# Patient Record
Sex: MALE | Race: WHITE | Employment: FULL TIME | URBAN - METROPOLITAN AREA
[De-identification: names, ages, dates, MRNs, and addresses within clinical notes are randomized per-mention and may not be internally consistent; named-entity substitution may affect disease eponyms.]

---

## 2023-02-07 ENCOUNTER — HOSPITAL ENCOUNTER (OUTPATIENT)
Dept: PHYSICAL THERAPY | Age: 48
Discharge: HOME | End: 2023-02-07
Payer: COMMERCIAL

## 2023-02-07 PROCEDURE — 97162 PT EVAL MOD COMPLEX 30 MIN: CPT

## 2023-02-07 NOTE — PROGRESS NOTES
[facility] at [address] [place] [address]  Phone: [phone]   Fax: [phone]     Plan of Care/ Statement of Necessity for Physical Therapy Services    Patient name: Le Neal Start of Care: 2023   Referral source: Iasbel Nieves MD : 1975    Medical Diagnosis: Pain in left shoulder [M25.512]   Onset Date: 23    Treatment Diagnosis:  Pain in left shoulder [M25.512]   Prior Hospitalization: see medical history Provider#: [id number]   Medications: Verified on Patient summary List    Comorbidities: Previous left shoulder pain prior to injury leading to surgery, Hx right shoulder pain   Prior Level of Function: functionally independent, active lifestyle including tennis, basketball, and cross fit      The Plan of Care and following information is based on the information from the initial evaluation. Assessment/ key information: Patient is a  39yo male who presents to [facility] with c/o left shoulder pain. Patient is s/p biceps tenodesis with rotator cuff repair (partial) and labral repair on 23. Patient reports his tears were partial and he is weaning from the sling, per physician's orders. Patient's impairments include pain, limited ROM, weakness. Patient reports decreased PLOF including difficulty reaching overhead to ADLs, difficulty with most ADLs, and inability to work out. Patient demonstrates decreased ROM, decreased strength, impaired posture, impaired gait mechancis, pain and decreased functional mobility tolerance. Evaluation Complexity History MEDIUM  Complexity : 1-2 comorbidities / personal factors will impact the outcome/ POC ; Examination HIGH Complexity : 4+ Standardized tests and measures addressing body structure, function, activity limitation and / or participation in recreation  ;Presentation MEDIUM Complexity : Evolving with changing characteristics  ; Clinical Decision Making HIGH Complexity : FOTO score of 1- 25  : FOTO score = an established functional score where 100 = no disability  Overall Complexity Rating: MEDIUM    Problem List: pain affecting function, decrease ROM, decrease strength, edema affecting function, impaired gait/ balance, decrease ADL/ functional abilitiies, decrease activity tolerance, decrease flexibility/ joint mobility, and decrease transfer abilities   Treatment Plan may include any combination of the following: Therapeutic exercise, Neuromuscular reeducation, Manual therapy, Therapeutic activity, Self care/home management, and Vasopneumatic device  Vasopnuematic compression justification:  Per bilateral girth measures taken and listed above the edema is considered significant and having an impact on the patient's strength, self care, and ADLs  Patient / Family readiness to learn indicated by: asking questions and interest  Persons(s) to be included in education: patient (P)  Barriers to Learning/Limitations: None  Measures taken if barriers to learning: NA  Patient Goal (s): get back to 'new' 100%  Patient Self Reported Health Status: excellent  Rehabilitation Potential: good    Short Term Goals: To be accomplished in 4 weeks:  Patient will be independent and compliant with HEP to progress toward goals and restore functional mobility. Eval Status: issued at eval     Pt will have painfree shoulder PROM to [facility] to aid in functional mechanics for ambulation/ADLs. Eval Status:   ROM:                            PROM              Shoulder Left Right   Flexion 57 WNL   ABD 65 WNL   ER 8  (Measured at 35 degrees abduction) WNL   IR Within available limits  (Measured at 35 degrees abduction) WNL         Long Term Goals: To be accomplished in 8 weeks:  Patient will improve FOTO score by 59 points (to 64) to improve functional tolerance for restoration of ADLs.   Eval Status: FOTO 4  FOTO score = an established functional score where 100 = no disability     Pt will have painfree left shoulder AROM to aid in functional mechanics for ambulation/ADLs. Eval Status:                                             AROM                            Shoulder Left Right   Flexion 58 WNL   ABD 52 WNL   ER NT due to pain WNL   IR Within available limits  (Measured at 35 degrees abduction) WNL         Pt will have at least 4/5 left shoulder strength to return to goals of returning to modified gym workouts. Eval Status: Strength NT due to post-op status     Patient will improve pain in left shoulder to 0-2/10 at worst to improve ADL and reaching tolerance and restore prior level of function. Eval Status: 3-4/10 at worst      Frequency / Duration: Patient to be seen 2 times per week for 8 weeks. Patient/ Caregiver education and instruction: Diagnosis, prognosis, brace/ splint application and exercises   [x]  Plan of care has been reviewed with FIDE Vidal, PT 2/7/2023 12:52 PM    ________________________________________________________________________    I certify that the above Therapy Services are being furnished while the patient is under my care. I agree with the treatment plan and certify that this therapy is necessary.     Physician's Signature:_____________________Date:____________TIME:________                                      Benja Cruz MD      Please sign and return to [facility] at [address]  Phone: [phone]   Fax: [phone]

## 2023-02-07 NOTE — PROGRESS NOTES
PT DAILY TREATMENT NOTE/CERVICAL EVAL 10-18    Patient Name: Polina Davila  Date:2023  : 1975  [x]  Patient  Verified  Payor: Gino Matthews / Plan: [place] [employer] [place] / Product Type: PPO /    In time:1:36  Out time:2:30  Total Treatment Time (min): 54  Visit #: 1 of 16    Medicare/[facility] Only   Total Timed Codes (min):  5 1:1 Treatment Time:  49       Treatment Area: Pain in left shoulder [M25.512]    SUBJECTIVE    Any medication changes, allergies to medications, adverse drug reactions, diagnosis change, or new procedure performed?: [x] No    [] Yes (see summary sheet for update)  Subjective functional status/changes:     Current symptoms/Complaints: left shoulder pain, post-op 23: biceps tenodesis with rotator cuff repair (partial) and labral repair  Mechanism of Injury: getting out of bed in 2022, but had been hurting during workouts for months prior    PLOF: functionally independent, active lifestyle including tennis, basketball, and cross fit  Limitations to PLOF: most activities including ADLs due to post-op status and sling, not performing any exercise activities that include left shoulder, weaning from sling      Pain Level (0-10 scale):    Intermittent based on activities- currently weaning from brace per Dr. 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  Living Situation: Patient lives with wife and teenaged kids  Pt Goals: \"get back to 'new' 100%\"  Barriers: []pain []financial []time []transportation []other  Motivation: high  Substance use: [x]Alcohol (occasional) []Tobacco []other:   Cognition: A & O x 3        OBJECTIVE    49 min [x]Eval                  []Re-Eval       5 min Therapeutic Exercise:  [] See flow sheet :   Rationale: increase ROM, improve coordination, and increase proprioception to improve the patients ability to reach overhead and restore PLOF      With   [] TE   [] TA   [] neuro   [] other: Patient Education: [x] Review HEP    [] Progressed/Changed HEP based on:   [x] positioning   [x] body mechanics   [] transfers   [] heat/ice application    [] other:        General Evaluation  Posture: symmetrical scapulae    Neuro Screen [x] WNL    Palpation  [] Min  [x] Mod  [] Severe    Location: increased tone in biceps and coracobrachialis    Incisions: well-healed and no signs of infections      ROM:                             AROM    PROM   Shoulder Left Right Left Right   Flexion 58 WNL 57 WNL   ABD 52 WNL 65 WNL   ER NT due to pain WNL 8  (Measured at 35 degrees abduction) WNL   IR Within available limits  (Measured at 35 degrees abduction) WNL Within available limits  (Measured at 35 degrees abduction) WNL       Strength (MMT):  Scapular retraction: 50% palpated muscle contraction on left vs. Right with symmetrical motion  Shoulder Left (1-5)   Shoulder Flexion NT due to post-op status   Shoulder Extension NT due to post-op status   Shoulder ABD NT due to post-op status   Shoulder ADD NT due to post-op status   Shoulder IR NT due to post-op status   Shoulder ER NT due to post-op status                                                Pain Level (0-10 scale) post treatment: 3/10    ASSESSMENT/Changes in Function: Patient is a  41yo male who presents to [facility] with c/o left shoulder pain. Patient is s/p biceps tenodesis with rotator cuff repair (partial) and labral repair on 1/18/23. Patient reports his tears were partial and he is weaning from the sling, per physician's orders. Patient's impairments include pain, limited ROM, weakness. Patient reports decreased PLOF including difficulty reaching overhead to ADLs, difficulty with most ADLs, and inability to work out. Patient demonstrates decreased ROM, decreased strength, impaired posture, impaired gait mechancis, pain and decreased functional mobility tolerance. Patient will continue to benefit from skilled PT services to modify and progress therapeutic interventions, address functional mobility deficits, address ROM deficits, address strength deficits, analyze and address soft tissue restrictions, analyze and cue movement patterns, analyze and modify body mechanics/ergonomics, assess and modify postural abnormalities, address imbalance/dizziness, and instruct in home and community integration to attain remaining goals. [x]  See Plan of Care  []  See progress note/recertification  []  See Discharge Summary         Progress towards goals / Updated goals:    Short Term Goals: To be accomplished in 4 weeks:  Patient will be independent and compliant with HEP to progress toward goals and restore functional mobility. Eval Status: issued at [facility]    Pt will have painfree shoulder PROM to [facility] to aid in functional mechanics for ambulation/ADLs. Eval Status:   ROM:                         PROM   Shoulder Left Right   Flexion 57 WNL   ABD 65 WNL   ER 8  (Measured at 35 degrees abduction) WNL   IR Within available limits  (Measured at 35 degrees abduction) WNL       Long Term Goals: To be accomplished in 8 weeks:  Patient will improve FOTO score by 59 points (to 64) to improve functional tolerance for restoration of ADLs. Eval Status: FOTO 4  FOTO score = an established functional score where 100 = no disability    Pt will have painfree left shoulder AROM to aid in functional mechanics for ambulation/ADLs. Eval Status:          AROM      Shoulder Left Right   Flexion 58 WNL   ABD 52 WNL   ER NT due to pain WNL   IR Within available limits  (Measured at 35 degrees abduction) WNL       Pt will have at least 4/5 left shoulder strength to return to goals of returning to modified gym workouts.   Eval Status: Strength NT due to post-op status    Patient will improve pain in left shoulder to 0-2/10 at worst to improve ADL and reaching tolerance and restore prior level of function.   Eval Status: 3-4/10 at worst      PLAN  [x]  Upgrade activities as tolerated     [x]  Continue plan of care  [x]  Update interventions per flow sheet       []  Discharge due to:_  []  Other:_      Stephane Valdez, PT 2/7/2023  12:52 PM

## 2023-02-09 ENCOUNTER — APPOINTMENT (OUTPATIENT)
Dept: PHYSICAL THERAPY | Age: 48
End: 2023-02-09
Payer: COMMERCIAL

## 2023-02-09 NOTE — PROGRESS NOTES
PT DAILY TREATMENT NOTE     Patient Name: Corrinne Panda  Date:2/10/2023  : 1975  [x]  Patient  Verified  Payor: Sandy Maciel / Plan: [place] [employer] [place] / Product Type: PPO /    In time:959  Out time:1031  Total Treatment Time (min): 32  Visit #: 2 of 16    Treatment Area: Pain in left shoulder [M25.512]    SUBJECTIVE  Pain Level (0-10 scale): 0/10  Any medication changes, allergies to medications, adverse drug reactions, diagnosis change, or new procedure performed?: [x] No    [] Yes (see summary sheet for update)  Subjective functional status/changes:   [] No changes reported  Reports no pain \"Just a little fatigue\"    OBJECTIVE      20 min Therapeutic Activity:  [x]  See flow sheet :   Rationale: increase ROM, increase strength, and improve coordination  to improve the patients ability to complete transfers and ADLs without external assist    12 min Manual Therapy:  PROM flx/abd with slight OP within pain range, gentle oscillations for relaxation    The manual therapy interventions were performed at a separate and distinct time from the therapeutic activities interventions. Rationale: decrease pain, increase ROM, and increase tissue extensibility to restore PLOF            With   [x] TE   [] TA   [] neuro   [] other: Patient Education: [x] Review HEP    [] Progressed/Changed HEP based on:   [] positioning   [] body mechanics   [] transfers   [] heat/ice application    [] other:      Other Objective/Functional Measures:   Initiate TE per flow sheet      Pain Level (0-10 scale) post treatment: 0/10    ASSESSMENT/Changes in Function: Patient tolerated treatment session well today. Patient had no complaints with initiation of exercise program to accomplish increased functional mobility and strength. Patient continues to make steady progress toward goals and would benefit from continued skilled PT intervention to address remaining deficits outlined in goals below.       Patient will continue to benefit from skilled PT services to modify and progress therapeutic interventions, address functional mobility deficits, address ROM deficits, address strength deficits, analyze and address soft tissue restrictions, analyze and cue movement patterns, analyze and modify body mechanics/ergonomics, and assess and modify postural abnormalities to attain remaining goals. []  See Plan of Care  []  See progress note/recertification  []  See Discharge Summary         Progress towards goals / Updated goals:  Short Term Goals: To be accomplished in 4 weeks:  Patient will be independent and compliant with HEP to progress toward goals and restore functional mobility. Eval Status: issued at eval  Current: 2/10/23: patient reports compliance with HEP      Pt will have painfree shoulder PROM to [facility] to aid in functional mechanics for ambulation/ADLs. Eval Status:   ROM:                            PROM              Shoulder Left Right   Flexion 57 WNL   ABD 65 WNL   ER 8  (Measured at 35 degrees abduction) WNL   IR Within available limits  (Measured at 35 degrees abduction) WNL         Long Term Goals: To be accomplished in 8 weeks:  Patient will improve FOTO score by 59 points (to 64) to improve functional tolerance for restoration of ADLs. Eval Status: FOTO 4  FOTO score = an established functional score where 100 = no disability     Pt will have painfree left shoulder AROM to aid in functional mechanics for ambulation/ADLs. Eval Status:                                             AROM                            Shoulder Left Right   Flexion 58 WNL   ABD 52 WNL   ER NT due to pain WNL   IR Within available limits  (Measured at 35 degrees abduction) WNL         Pt will have at least 4/5 left shoulder strength to return to goals of returning to modified gym workouts.   Eval Status: Strength NT due to post-op status     Patient will improve pain in left shoulder to 0-2/10 at worst to improve ADL and reaching tolerance and restore prior level of function.   Eval Status: 3-4/10 at worst      PLAN  [x]  Upgrade activities as tolerated     [x]  Continue plan of care  []  Update interventions per flow sheet       []  Discharge due to:_  []  Other:_      Ashley Burleson PTA 2/9/2023  3:06 PM    Future Appointments   Date Time Provider Pete Valentine   2/10/2023 10:00 AM [facility]

## 2023-02-10 ENCOUNTER — HOSPITAL ENCOUNTER (OUTPATIENT)
Dept: PHYSICAL THERAPY | Age: 48
Discharge: HOME | End: 2023-02-10
Payer: COMMERCIAL

## 2023-02-10 PROCEDURE — 97530 THERAPEUTIC ACTIVITIES: CPT

## 2023-02-10 PROCEDURE — 97140 MANUAL THERAPY 1/> REGIONS: CPT

## 2023-02-14 ENCOUNTER — APPOINTMENT (OUTPATIENT)
Facility: HOSPITAL | Age: 48
End: 2023-02-14
Payer: COMMERCIAL

## 2023-02-16 ENCOUNTER — APPOINTMENT (OUTPATIENT)
Facility: HOSPITAL | Age: 48
End: 2023-02-16
Payer: COMMERCIAL

## 2023-02-21 ENCOUNTER — HOSPITAL ENCOUNTER (OUTPATIENT)
Facility: HOSPITAL | Age: 48
Setting detail: RECURRING SERIES
Discharge: HOME | End: 2023-02-24
Payer: COMMERCIAL

## 2023-02-21 PROCEDURE — 97110 THERAPEUTIC EXERCISES: CPT

## 2023-02-21 PROCEDURE — 97140 MANUAL THERAPY 1/> REGIONS: CPT

## 2023-02-21 NOTE — PROGRESS NOTES
PHYSICAL / OCCUPATIONAL THERAPY - DAILY TREATMENT NOTE (updated )    Patient Name: Josselin Baptiste    Date: 2023    : 1975  Insurance: Payor: Penny Angle / Plan: [employer] PPO / Product Type: *No Product type* /      Patient  verified Yes     Visit #   Current / Total 3 16   Time   In / Out 4:30 5:01   Pain   In / Out 0 0   Subjective Functional Status/Changes: Patient brought op report from physician, which clarified surgery. Has weaned from sling. Changes to:  Meds, Allergies, Med Hx, Sx Hx? If yes, update Summary List no       TREATMENT AREA =  Left shoulder pain [M25.512]  Superior glenoid labrum lesion of left shoulder, subsequent encounter [S47.199E]    OBJECTIVE         Therapeutic Procedures: Tx Min Billable or 1:1 Min (if diff from Tx Min) Procedure, Rationale, Specifics   21  34864 Therapeutic Exercise (timed):  increase ROM, strength, coordination, balance, and proprioception to improve patient's ability to progress to PLOF and address remaining functional goals. (see flow sheet as applicable)     Details if applicable:       10  20501 Manual Therapy (timed):  decrease pain and increase ROM to improve patient's ability to progress to PLOF and address remaining functional goals. The manual therapy interventions were performed at a separate and distinct time from the therapeutic activities interventions .  (see flow sheet as applicable)     Details if applicable:  Scapular mobs to left shoulder in all directions in sidelying          Details if applicable:            Details if applicable:            Details if applicable:     27  [facility] Totals Reminder: bill using total billable min of TIMED therapeutic procedures (example: do not include dry needle or estim unattended, both untimed codes, in totals to left)  8-22 min = 1 unit; 23-37 min = 2 units; 38-52 min = 3 units; 53-67 min = 4 units; 68-82 min = 5 units   Total Total     [x]  Patient Education billed concurrently with other procedures [x] Review HEP    [] Progressed/Changed HEP, detail:    [] Other detail:       Objective Information/Functional Measures/Assessment    Patient reports no adverse reactions to therapy. Skilled therapy intervention addressed progressing shoulder pre-strengthening and decreasing scapular discomfort. Per physician's operative report, patient's surgery was left shoulder biceps tenodesis, extensive GH/labral debridement, and subacromial/rotator cuff decompression (see note in chart). Patient tolerated progression to isometric exercises and HEP was progressed. Scapular manual therapy aimed to manage scapular discomfort and mobility. Progress HEP and exercises next visit. Patient is making good progress towards goals and will benefit from continued therapy to achieve goals and maximize function/restore PLOF. Patient will continue to benefit from skilled PT / OT services to modify and progress therapeutic interventions, analyze and address functional mobility deficits, analyze and address ROM deficits, analyze and address strength deficits, analyze and address soft tissue restrictions, analyze and cue for proper movement patterns, analyze and modify for postural abnormalities, analyze and address imbalance/dizziness, and instruct in home and community integration to address functional deficits and attain remaining goals. Progress toward goals / Updated goals:  []  See Progress Note/Recertification    Short Term Goals: To be accomplished in 4 weeks:  Patient will be independent and compliant with HEP to progress toward goals and restore functional mobility. Eval Status: issued at eval  Current: 2/10/23: patient reports compliance with HEP      Pt will have painfree shoulder PROM to [facility] to aid in functional mechanics for ambulation/ADLs.   Eval Status:   ROM:                            PROM              Shoulder Left Right   Flexion 57 WNL   ABD 65 WNL   ER 8  (Measured at 35 degrees abduction) WNL   IR Within available limits  (Measured at 35 degrees abduction) WNL         Long Term Goals: To be accomplished in 8 weeks:  Patient will improve FOTO score by 59 points (to 64) to improve functional tolerance for restoration of ADLs. Eval Status: FOTO 4  FOTO score = an established functional score where 100 = no disability     Pt will have painfree left shoulder AROM to aid in functional mechanics for ambulation/ADLs. Eval Status:                                             AROM                            Shoulder Left Right   Flexion 58 WNL   ABD 52 WNL   ER NT due to pain WNL   IR Within available limits  (Measured at 35 degrees abduction) WNL    Current: 2/21/23: per observation, paitent's flexion in approaching 150 degrees passively; pain with abduction ROM     Pt will have at least 4/5 left shoulder strength to return to goals of returning to modified gym workouts. Eval Status: Strength NT due to post-op status     Patient will improve pain in left shoulder to 0-2/10 at worst to improve ADL and reaching tolerance and restore prior level of function.   Eval Status: 3-4/10 at worst      PLAN  Yes  Continue plan of care  []  Upgrade activities as tolerated  []  Discharge due to :  []  Other:    Samira Olson PT    2/21/2023    1:23 PM    Future Appointments   Date Time Provider Izabella Rodriges   2/21/2023  4:30 PM KAREN Sevilla [facility]   2/24/2023  9:00 AM KAREN Leong [facility]   2/27/2023  1:00 PM Andrea [facility]   3/2/2023  5:00 PM Denis Araiza [facility]   3/7/2023  4:00 PM Denis Araiza [facility]   3/9/2023  5:00 PM Edgar Govea [facility]

## 2023-02-24 ENCOUNTER — HOSPITAL ENCOUNTER (OUTPATIENT)
Facility: HOSPITAL | Age: 48
Setting detail: RECURRING SERIES
Discharge: HOME | End: 2023-02-27
Payer: COMMERCIAL

## 2023-02-24 PROCEDURE — 97140 MANUAL THERAPY 1/> REGIONS: CPT

## 2023-02-24 PROCEDURE — 97530 THERAPEUTIC ACTIVITIES: CPT

## 2023-02-24 NOTE — PROGRESS NOTES
PHYSICAL / OCCUPATIONAL THERAPY - DAILY TREATMENT NOTE (updated )    Patient Name: Abdoul Mederos    Date: 2023    : 1975  Insurance: Payor: Krystyna Arevalo / Plan: OPTIMA PPO / Product Type: *No Product type* /      Patient  verified Yes     Visit #   Current / Total 4 16   Time   In / Out 0900 0942   Pain   In / Out 0 0   Subjective Functional Status/Changes: \"I have been working hard on the exercises at home and the shoulder is definitely starting to move better. \"   Changes to:  Meds, Allergies, Med Hx, Sx Hx? If yes, update Summary List no       TREATMENT AREA =  Left shoulder pain [M25.512]  Superior glenoid labrum lesion of left shoulder, subsequent encounter [S40.977F]    OBJECTIVE    Therapeutic Procedures: Tx Min Billable or 1:1 Min (if diff from Tx Min) Procedure, Rationale, Specifics   32  83217 Therapeutic Exercise (timed):  increase ROM, strength, coordination, balance, and proprioception to improve patient's ability to progress to PLOF and address remaining functional goals. (see flow sheet as applicable)     Details if applicable:       10  38286 Manual Therapy (timed):  decrease pain, increase ROM, and increase tissue extensibility to improve patient's ability to progress to PLOF and address remaining functional goals. The manual therapy interventions were performed at a separate and distinct time from the therapeutic activities interventions .  (see flow sheet as applicable)     Details if applicable:            Details if applicable:            Details if applicable:            Details if applicable:     43  [facility] Totals Reminder: bill using total billable min of TIMED therapeutic procedures (example: do not include dry needle or estim unattended, both untimed codes, in totals to left)  8-22 min = 1 unit; 23-37 min = 2 units; 38-52 min = 3 units; 53-67 min = 4 units; 68-82 min = 5 units   Total Total     [x]  Patient Education billed concurrently with other procedures   [x] Review HEP [] Progressed/Changed HEP, detail:    [] Other detail:       Objective Information/Functional Measures/Assessment    Patient reporting high consistency and good tolerance with initial exercises. Patient did report slight increased pain with abduction table slides. Transitioned to standing wand abduction AAROM instead of table slides with decreased pain noted. Patient will continue to benefit from skilled PT / OT services to modify and progress therapeutic interventions, analyze and address functional mobility deficits, analyze and address ROM deficits, analyze and address strength deficits, analyze and address soft tissue restrictions, analyze and cue for proper movement patterns, analyze and modify for postural abnormalities, analyze and address imbalance/dizziness, and instruct in home and community integration to address functional deficits and attain remaining goals. Progress toward goals / Updated goals:  []  See Progress Note/Recertification    Short Term Goals: To be accomplished in 4 weeks:  Patient will be independent and compliant with HEP to progress toward goals and restore functional mobility. Eval Status: issued at eval  Current: 2/10/23: patient reports compliance with HEP      Pt will have painfree shoulder PROM to [facility] to aid in functional mechanics for ambulation/ADLs. Eval Status:   ROM:                            PROM              Shoulder Left Right   Flexion 57 WNL   ABD 65 WNL   ER 8  (Measured at 35 degrees abduction) WNL   IR Within available limits  (Measured at 35 degrees abduction) WNL   Current: flex 145, abd 85, ER 44, IR 38 In-progress, 2/24/2023      Long Term Goals: To be accomplished in 8 weeks:  Patient will improve FOTO score by 59 points (to 64) to improve functional tolerance for restoration of ADLs.   Eval Status: FOTO 4  FOTO score = an established functional score where 100 = no disability     Pt will have painfree left shoulder AROM to aid in functional mechanics for ambulation/ADLs. Eval Status:                                             AROM                            Shoulder Left Right   Flexion 58 WNL   ABD 52 WNL   ER NT due to pain WNL   IR Within available limits  (Measured at 35 degrees abduction) WNL    Current: 2/21/23: per observation, paitent's flexion in approaching 150 degrees passively; pain with abduction ROM     Pt will have at least 4/5 left shoulder strength to return to goals of returning to modified gym workouts. Eval Status: Strength NT due to post-op status     Patient will improve pain in left shoulder to 0-2/10 at worst to improve ADL and reaching tolerance and restore prior level of function.   Eval Status: 3-4/10 at worst      PLAN  Yes  Continue plan of care  []  Upgrade activities as tolerated  []  Discharge due to :  []  Other:    Jimmie Marquis, PT    2/24/2023    9:14 AM    Future Appointments   Date Time Provider Izabella Rodriges   2/27/2023  1:00 PM Andrea [facility]   3/2/2023  5:00 PM Denis Camejo [facility]   3/7/2023  4:00 PM [id number] Meghna Huddleston THE [facility]   3/9/2023  5:00 PM Radha Avila THE [facility]

## 2023-02-27 ENCOUNTER — HOSPITAL ENCOUNTER (OUTPATIENT)
Facility: HOSPITAL | Age: 48
Setting detail: RECURRING SERIES
Discharge: HOME | End: 2023-03-02
Payer: COMMERCIAL

## 2023-02-27 PROCEDURE — 97140 MANUAL THERAPY 1/> REGIONS: CPT

## 2023-02-27 PROCEDURE — 97110 THERAPEUTIC EXERCISES: CPT

## 2023-02-27 NOTE — PROGRESS NOTES
PHYSICAL / OCCUPATIONAL THERAPY - DAILY TREATMENT NOTE (updated )    Patient Name: Harsha Ashley    Date: 2023    : 1975  Insurance: Payor: Eric Perla / Plan: OPTIMA PPO / Product Type: *No Product type* /      Patient  verified Yes   Visit #   Current / Total 5 16   Time   In / Out 1537 1610   Pain   In / Out 0 0   Subjective Functional Status/Changes: Patient reports compliance with HEP. Changes to:  Meds, Allergies, Med Hx, Sx Hx? If yes, update Summary List no       TREATMENT AREA =  Left shoulder pain [M25.512]  Superior glenoid labrum lesion of left shoulder, subsequent encounter [S49.001O]    OBJECTIVE    Therapeutic Procedures: Tx Min Billable or 1:1 Min (if diff from Tx Min) Procedure, Rationale, Specifics   23  76742 Therapeutic Exercise (timed):  increase ROM, strength, coordination, balance, and proprioception to improve patient's ability to progress to PLOF and address remaining functional goals. (see flow sheet as applicable)     Details if applicable:       10  88672 Manual Therapy (timed):  decrease pain and increase ROM to improve patient's ability to progress to PLOF and address remaining functional goals. The manual therapy interventions were performed at a separate and distinct time from the therapeutic activities interventions .  (see flow sheet as applicable)     Details if applicable:     Scapular mobs to left shoulder in all directions, PROM of the left shoulder   35  [facility] Totals Reminder: bill using total billable min of TIMED therapeutic procedures (example: do not include dry needle or estim unattended, both untimed codes, in totals to left)  8-22 min = 1 unit; 23-37 min = 2 units; 38-52 min = 3 units; 53-67 min = 4 units; 68-82 min = 5 units   Total Total     [x]  Patient Education billed concurrently with other procedures   [x] Review HEP    [] Progressed/Changed HEP, detail:    [] Other detail:       Objective Information/Functional Measures/Assessment    Reviewed surgical precautions with HEP. He required cues to recall exercise parameters. Provided cues to reduce muscle guarding with manual therapy. Patient will continue to benefit from skilled PT / OT services to modify and progress therapeutic interventions, analyze and address functional mobility deficits, analyze and address ROM deficits, analyze and address strength deficits, analyze and address soft tissue restrictions, analyze and cue for proper movement patterns, analyze and modify for postural abnormalities, and instruct in home and community integration to address functional deficits and attain remaining goals. Progress toward goals / Updated goals:  []  See Progress Note/Recertification       Short Term Goals: To be accomplished in 4 weeks:  Patient will be independent and compliant with HEP to progress toward goals and restore functional mobility. Eval Status: issued at eval  Current: 2/10/23: patient reports compliance with HEP      Pt will have painfree shoulder PROM to [facility] to aid in functional mechanics for ambulation/ADLs. Eval Status:   ROM:                            PROM              Shoulder Left Right   Flexion 57 WNL   ABD 65 WNL   ER 8  (Measured at 35 degrees abduction) WNL   IR Within available limits  (Measured at 35 degrees abduction) WNL   Current: flex 145, abd 85, ER 44, IR 38         In-progress, 2/24/2023      Long Term Goals: To be accomplished in 8 weeks:  Patient will improve FOTO score by 59 points (to 64) to improve functional tolerance for restoration of ADLs. Eval Status: FOTO 4  FOTO score = an established functional score where 100 = no disability     Pt will have painfree left shoulder AROM to aid in functional mechanics for ambulation/ADLs.   Eval Status:                                             AROM                            Shoulder Left Right   Flexion 58 WNL   ABD 52 WNL   ER NT due to pain WNL   IR Within available limits  (Measured at 35 degrees abduction) WNL Current: 2/21/23: per observation, josefinatent's flexion in approaching 150 degrees passively; pain with abduction ROM     Pt will have at least 4/5 left shoulder strength to return to goals of returning to modified gym workouts. Eval Status: Strength NT due to post-op status     Patient will improve pain in left shoulder to 0-2/10 at worst to improve ADL and reaching tolerance and restore prior level of function.   Eval Status: 3-4/10 at worst    PLAN  Yes  Continue plan of care  []  Upgrade activities as tolerated  []  Discharge due to :  []  Other:    Ronal Kussmaul, PT    2/27/2023    3:52 PM    Future Appointments   Date Time Provider Izabella Rodriges   3/2/2023  5:00 PM [id number] Meghna Huddleston [facility]   3/7/2023  4:00 PM Denis Lisa [facility]   3/9/2023  5:00 PM Dre Saenz [facility]

## 2023-03-02 ENCOUNTER — HOSPITAL ENCOUNTER (OUTPATIENT)
Facility: HOSPITAL | Age: 48
Setting detail: RECURRING SERIES
Discharge: HOME | End: 2023-03-05
Payer: COMMERCIAL

## 2023-03-02 PROCEDURE — 97112 NEUROMUSCULAR REEDUCATION: CPT

## 2023-03-02 PROCEDURE — 97530 THERAPEUTIC ACTIVITIES: CPT

## 2023-03-02 PROCEDURE — 97110 THERAPEUTIC EXERCISES: CPT

## 2023-03-02 NOTE — PROGRESS NOTES
PHYSICAL / OCCUPATIONAL THERAPY - DAILY TREATMENT NOTE (updated )    Patient Name: Brenda Anderson    Date: 3/2/2023    : 1975  Insurance: Payor: Florencia Bailon / Plan: OPTIMA PPO / Product Type: *No Product type* /      Patient  verified Yes   Visit #   Current / Total 6 16   Time   In / Out 1700 1738   Pain   In / Out 0 0   Subjective Functional Status/Changes: Patient reports that he is ding well and looks forward to progressing. Changes to:  Meds, Allergies, Med Hx, Sx Hx? If yes, update Summary List no       TREATMENT AREA =  Left shoulder pain [M25.512]  Superior glenoid labrum lesion of left shoulder, subsequent encounter [S4.821T]    OBJECTIVE    Therapeutic Procedures: Tx Min Billable or 1:1 Min (if diff from Tx Min) Procedure, Rationale, Specifics   20  40948 Therapeutic Exercise (timed):  increase ROM, strength, coordination, balance, and proprioception to improve patient's ability to progress to PLOF and address remaining functional goals. (see flow sheet as applicable)     Details if applicable:       8  39214 Therapeutic Activity (timed):  use of dynamic activities replicating functional movements to increase ROM, strength, coordination, balance, and proprioception in order to improve patient's ability to progress to PLOF and address remaining functional goals. (see flow sheet as applicable)     Details if applicable:     10  94862 Neuromuscular Re-Education (timed):  improve balance, coordination, kinesthetic sense, posture, core stability and proprioception to improve patient's ability to develop conscious control of individual muscles and awareness of position of extremities in order to progress to PLOF and address remaining functional goals.  (see flow sheet as applicable)     Details if applicable:     45  [facility] Totals Reminder: bill using total billable min of TIMED therapeutic procedures (example: do not include dry needle or estim unattended, both untimed codes, in totals to left)  8-22 min = 1 unit; 23-37 min = 2 units; 38-52 min = 3 units; 53-67 min = 4 units; 68-82 min = 5 units   Total Total     [x]  Patient Education billed concurrently with other procedures   [x] Review HEP    [] Progressed/Changed HEP, detail:    [] Other detail:       Objective Information/Functional Measures/Assessment    Patient demonstrates improved tolerance to exercise. Advanced exercise by increasing ER ROM AAROM. Also introduced resisted shoulder flexion and he was challenged with exercise prescribed. Will advance exercise per rehabilitation protocol. Patient will continue to benefit from skilled PT / OT services to modify and progress therapeutic interventions, analyze and address functional mobility deficits, analyze and address ROM deficits, analyze and address strength deficits, analyze and address soft tissue restrictions, analyze and cue for proper movement patterns, analyze and modify for postural abnormalities, analyze and address imbalance/dizziness, and instruct in home and community integration to address functional deficits and attain remaining goals. Progress toward goals / Updated goals:  []  See Progress Note/Recertification    Short Term Goals: To be accomplished in 4 weeks:  Patient will be independent and compliant with HEP to progress toward goals and restore functional mobility. Eval Status: issued at eval  Current: 2/10/23: patient reports compliance with HEP      Pt will have painfree shoulder PROM to [facility] to aid in functional mechanics for ambulation/ADLs. Eval Status:   ROM:                            PROM              Shoulder Left Right   Flexion 57 WNL   ABD 65 WNL   ER 8  (Measured at 35 degrees abduction) WNL   IR Within available limits  (Measured at 35 degrees abduction) WNL   Current: flex 145, abd 85, ER 44, IR 38         In-progress, 2/24/2023      Long Term Goals:  To be accomplished in 8 weeks:  Patient will improve FOTO score by 59 points (to 64) to improve functional tolerance for restoration of ADLs. Eval Status: FOTO 4  FOTO score = an established functional score where 100 = no disability     Pt will have painfree left shoulder AROM to aid in functional mechanics for ambulation/ADLs. Eval Status:                                             AROM                            Shoulder Left Right   Flexion 58 WNL   ABD 52 WNL   ER NT due to pain WNL   IR Within available limits  (Measured at 35 degrees abduction) WNL    Current: 2/21/23: per observation, paitent's flexion in approaching 150 degrees passively; pain with abduction ROM     Pt will have at least 4/5 left shoulder strength to return to goals of returning to modified gym workouts. Eval Status: Strength NT due to post-op status     Patient will improve pain in left shoulder to 0-2/10 at worst to improve ADL and reaching tolerance and restore prior level of function.   Eval Status: 3-4/10 at worst     PLAN  Yes  Continue plan of care  []  Upgrade activities as tolerated  []  Discharge due to :  []  Other:    Jorge Smith, PT    3/2/2023    5:15 PM    Future Appointments   Date Time Provider Izabella Rodriges   3/7/2023  4:00 PM Torito Piña [facility]   3/9/2023  5:00 PM Torito Piña [facility]

## 2023-03-07 ENCOUNTER — HOSPITAL ENCOUNTER (OUTPATIENT)
Facility: HOSPITAL | Age: 48
Setting detail: RECURRING SERIES
Discharge: HOME | End: 2023-03-10
Payer: COMMERCIAL

## 2023-03-07 PROCEDURE — 97112 NEUROMUSCULAR REEDUCATION: CPT

## 2023-03-07 PROCEDURE — 97110 THERAPEUTIC EXERCISES: CPT

## 2023-03-07 PROCEDURE — 97530 THERAPEUTIC ACTIVITIES: CPT

## 2023-03-07 NOTE — PROGRESS NOTES
PHYSICAL / OCCUPATIONAL THERAPY - DAILY TREATMENT NOTE (updated )    Patient Name: Michael Schreiber    Date: 3/7/2023    : 1975  Insurance: Payor: Michael Tripathi / Plan: OPTIMA PPO / Product Type: *No Product type* /      Patient  verified Yes   Visit #   Current / Total 7 16   Time   In / Out 1607 1650   Pain   In / Out 2 1   Subjective Functional Status/Changes: Patient reports that his shoulder is sore today. Changes to:  Meds, Allergies, Med Hx, Sx Hx? If yes, update Summary List no       TREATMENT AREA =  Left shoulder pain [M25.512]  Superior glenoid labrum lesion of left shoulder, subsequent encounter [S41.432W]    OBJECTIVE    Therapeutic Procedures: Tx Min Billable or 1:1 Min (if diff from Tx Min) Procedure, Rationale, Specifics   20  31426 Therapeutic Exercise (timed):  increase ROM, strength, coordination, balance, and proprioception to improve patient's ability to progress to PLOF and address remaining functional goals. (see flow sheet as applicable)     Details if applicable:        Therapeutic Activity (timed):  use of dynamic activities replicating functional movements to increase ROM, strength, coordination, balance, and proprioception in order to improve patient's ability to progress to PLOF and address remaining functional goals. (see flow sheet as applicable)     Details if applicable:     10  29287 Neuromuscular Re-Education (timed):  improve balance, coordination, kinesthetic sense, posture, core stability and proprioception to improve patient's ability to develop conscious control of individual muscles and awareness of position of extremities in order to progress to PLOF and address remaining functional goals.  (see flow sheet as applicable)     Details if applicable:     52  [facility] Totals Reminder: bill using total billable min of TIMED therapeutic procedures (example: do not include dry needle or estim unattended, both untimed codes, in totals to left)  8-22 min = 1 unit; 23-37 min = 2 units; 38-52 min = 3 units; 53-67 min = 4 units; 68-82 min = 5 units   Total Total     [x]  Patient Education billed concurrently with other procedures   [x] Review HEP    [] Progressed/Changed HEP, detail:    [] Other detail:       Objective Information/Functional Measures/Assessment    Advanced exercise by increasing resistance. Patient was challenged with as he reports tightness sensation in his shoulder. Educated patient about shoulder mechanics with [employer] and he demonstrated understanding. Will advance HEP next visit.     Patient will continue to benefit from skilled PT / OT services to modify and progress therapeutic interventions, analyze and address functional mobility deficits, analyze and address ROM deficits, analyze and address strength deficits, analyze and address soft tissue restrictions, analyze and cue for proper movement patterns, analyze and modify for postural abnormalities, analyze and address imbalance/dizziness, and instruct in home and community integration to address functional deficits and attain remaining goals.    Progress toward goals / Updated goals:  []  See Progress Note/Recertification    Short Term Goals: To be accomplished in 4 weeks:  Patient will be independent and compliant with HEP to progress toward goals and restore functional mobility.   Eval Status: issued at eval  Current: Met, 3/7/2023     Pt will have painfree shoulder PROM to [facility] to aid in functional mechanics for ambulation/ADLs.  Eval Status:   ROM:                            PROM              Shoulder Left Right   Flexion 57 WNL   ABD 65 WNL   ER 8  (Measured at 35 degrees abduction) WNL   IR Within available limits  (Measured at 35 degrees abduction) WNL   Current: flex 145, abd 85, ER 44, IR 38         In-progress, 2/24/2023      Long Term Goals: To be accomplished in 8 weeks:  Patient will improve FOTO score by 59 points (to 64) to improve functional tolerance for restoration of ADLs.  Eval  Status: FOTO 4  FOTO score = an established functional score where 100 = no disability     Pt will have painfree left shoulder AROM to aid in functional mechanics for ambulation/ADLs. Eval Status:                                             AROM                            Shoulder Left Right   Flexion 58 WNL   ABD 52 WNL   ER NT due to pain WNL   IR Within available limits  (Measured at 35 degrees abduction) WNL    Current: 2/21/23: per observation, paitent's flexion in approaching 150 degrees passively; pain with abduction ROM     Pt will have at least 4/5 left shoulder strength to return to goals of returning to modified gym workouts. Eval Status: Strength NT due to post-op status     Patient will improve pain in left shoulder to 0-2/10 at worst to improve ADL and reaching tolerance and restore prior level of function.   Eval Status: 3-4/10 at worst    PLAN  Yes  Continue plan of care  []  Upgrade activities as tolerated  []  Discharge due to :  []  Other:    Laura Davis, PT    3/7/2023    3:25 PM    Future Appointments   Date Time Provider Izabella Rodriges   3/7/2023  4:00 PM [id number] Meghna Huddleston THE [facility]   3/9/2023  5:00 PM Dre Forde THE [facility]

## 2023-03-09 ENCOUNTER — HOSPITAL ENCOUNTER (OUTPATIENT)
Facility: HOSPITAL | Age: 48
Setting detail: RECURRING SERIES
Discharge: HOME | End: 2023-03-12
Payer: COMMERCIAL

## 2023-03-09 PROCEDURE — 97530 THERAPEUTIC ACTIVITIES: CPT

## 2023-03-09 PROCEDURE — 97112 NEUROMUSCULAR REEDUCATION: CPT

## 2023-03-09 PROCEDURE — 97110 THERAPEUTIC EXERCISES: CPT

## 2023-03-10 NOTE — PROGRESS NOTES
[facility] at [address] [place] [address] Drive: [phone]   Fax: [phone]  Progress Note  Patient Name: Susan Bonilla : 1975   Medical   Diagnosis: Left shoulder pain [M25.512]  Superior glenoid labrum lesion of left shoulder, subsequent encounter [S43.432D] Treatment Diagnosis: Pain in left shoulder   Onset Date: 23     Referral Source: Balwinder Buitrago MD [facility]): 2023   Prior Hospitalization: See medical history Provider #: [id number]   Prior Level of Function:  functionally independent, active lifestyle including tennis, basketball, and cross fit   Comorbidities:  Previous left shoulder pain prior to injury leading to surgery, Hx right shoulder pain   Medications: Verified on Patient Summary List      ===========================================================================================  Assessment / Summary of Care:  Susan Bonilla is a 57 y.o. male s/p left shoulder debridement, sub acromial decompression and biceps tenodesis performed on 2023. Patient is improving has is progressing toward his short and long term goals. He is developing strength and functional mobility but is still limited. He is compliant with HEP. He demonstrates improved AROM but is still limited in IR/ER. He has just begun light strengthening. Will continue to progress toward strength and ROM goals in future visits.  Patient will continue to benefit from skilled PT services to modify and progress therapeutic interventions, analyze and address functional mobility deficits, analyze and address ROM deficits, analyze and address strength deficits, analyze and address soft tissue restrictions, analyze and cue for proper movement patterns, analyze and modify for postural abnormalities, and instruct in home and community integration to address functional deficits and attain remaining goals.    ===========================================================================================    Plan:Continue therapy per initial plan/protocol at a frequency of  1-2 x per week for 8 weeks    Goals:   Short Term Goals: To be accomplished in 4 weeks:  Patient will be independent and compliant with HEP to progress toward goals and restore functional mobility. Eval Status: issued at eval  Current: Met, 3/7/2023     Pt will have painfree shoulder PROM to [facility] to aid in functional mechanics for ambulation/ADLs. Eval Status:   ROM:                            PROM              Shoulder Left Right   Flexion 57 WNL   ABD 65 WNL   ER 8  (Measured at 35 degrees abduction) WNL   IR Within available limits  (Measured at 35 degrees abduction) WNL   Current: In-progress, flexion 170 degrees, ER 60 degrees, 3/9/2023     Long Term Goals: To be accomplished in 8 weeks:  Patient will improve FOTO score by 59 points (to 64) to improve functional tolerance for restoration of ADLs. Eval Status: FOTO 4  FOTO score = an established functional score where 100 = no disability  Current: In-progress, 54, 3/9/2023     Pt will have painfree left shoulder AROM to aid in functional mechanics for ambulation/ADLs. Eval Status:                                             AROM                            Shoulder Left Right   Flexion 58 WNL   ABD 52 WNL   ER NT due to pain WNL   IR Within available limits  (Measured at 35 degrees abduction) WNL    Current: In-progress, Left shoulder AROM 168 degrees flexion, 3/9/2023     Pt will have at least 4+/5 left shoulder strength to return to goals of returning to modified gym workouts. Eval Status: Strength NT due to post-op status  Current: In-progress, 3+/5, 3/9/2023     Patient will improve pain in left shoulder to 0-2/10 at worst to improve ADL and reaching tolerance and restore prior level of function. Eval Status: 3-4/10 at worst  Current:  In-progress,2-3/10, 3/9/2023 ===========================================================================================  Subjective: Patient reports compliance with HEP activities.        Objective:   MMT 3+/5  FOTO 54  Left shoulder AROM 168 degrees flexion    Therapist Signature: Enrico Roland PT, DPT Date: 4/56/8022   Re-Certification: NA Time: 12:36 PM       [facility] at [address]  Phone: [phone]   Fax: [phone]

## 2023-03-14 ENCOUNTER — APPOINTMENT (OUTPATIENT)
Facility: HOSPITAL | Age: 48
End: 2023-03-14
Payer: COMMERCIAL

## 2023-03-15 ENCOUNTER — HOSPITAL ENCOUNTER (OUTPATIENT)
Facility: HOSPITAL | Age: 48
Setting detail: RECURRING SERIES
Discharge: HOME | End: 2023-03-18
Payer: COMMERCIAL

## 2023-03-15 PROCEDURE — 97110 THERAPEUTIC EXERCISES: CPT

## 2023-03-15 PROCEDURE — 97530 THERAPEUTIC ACTIVITIES: CPT

## 2023-03-15 PROCEDURE — 97140 MANUAL THERAPY 1/> REGIONS: CPT

## 2023-03-15 NOTE — PROGRESS NOTES
PHYSICAL / OCCUPATIONAL THERAPY - DAILY TREATMENT NOTE (updated )    Patient Name: Claudia Peterson    Date: 3/15/2023    : 1975  Insurance: Payor: Mateusz Richardson / Plan: [employer] PPO / Product Type: *No Product type* /      Patient  verified Yes     Visit #   Current / Total 9 16   Time   In / Out 1102 1142   Pain   In / Out 0 0   Subjective Functional Status/Changes: \"I have no pain at all today, the shoulder has been feeling real good. \"   Changes to:  Meds, Allergies, Med Hx, Sx Hx? If yes, update Summary List no       TREATMENT AREA =  Left shoulder pain [M25.512]  Superior glenoid labrum lesion of left shoulder, subsequent encounter [S44.171T]    OBJECTIVE    Therapeutic Procedures: Tx Min Billable or 1:1 Min (if diff from Tx Min) Procedure, Rationale, Specifics   15  95660 Therapeutic Exercise (timed):  increase ROM, strength, coordination, balance, and proprioception to improve patient's ability to progress to PLOF and address remaining functional goals. (see flow sheet as applicable)     Details if applicable:       15  01699 Therapeutic Activity (timed):  use of dynamic activities replicating functional movements to increase ROM, strength, coordination, balance, and proprioception in order to improve patient's ability to progress to PLOF and address remaining functional goals. (see flow sheet as applicable)     Details if applicable:     10  94758 Manual Therapy (timed):  decrease pain, increase ROM, and increase tissue extensibility to improve patient's ability to progress to PLOF and address remaining functional goals. The manual therapy interventions were performed at a separate and distinct time from the therapeutic activities interventions . (see flow sheet as applicable)     Details if applicable:  Gentle glenohumeral joint mobilizations in supine emphasizing posterior glide to improve IR AROM.           Details if applicable:            Details if applicable:       [facility] BC Totals Reminder: bill using total billable min of TIMED therapeutic procedures (example: do not include dry needle or estim unattended, both untimed codes, in totals to left)  8-22 min = 1 unit; 23-37 min = 2 units; 38-52 min = 3 units; 53-67 min = 4 units; 68-82 min = 5 units   Total Total     [x]  Patient Education billed concurrently with other procedures   [x] Review HEP    [] Progressed/Changed HEP, detail:    [] Other detail:       Objective Information/Functional Measures/Assessment    Increased repetitions of light resistive exercises with good tolerance noted. Provided patient written sample of biceps tenodesis protocol to assist patient in determining how to carefully advance daily activities. Patient will continue to benefit from skilled PT / OT services to modify and progress therapeutic interventions, analyze and address functional mobility deficits, analyze and address ROM deficits, analyze and address strength deficits, analyze and address soft tissue restrictions, analyze and cue for proper movement patterns, analyze and modify for postural abnormalities, analyze and address imbalance/dizziness, and instruct in home and community integration to address functional deficits and attain remaining goals. Progress toward goals / Updated goals:  []  See Progress Note/Recertification    Short Term Goals: To be accomplished in 4 weeks:  Patient will be independent and compliant with HEP to progress toward goals and restore functional mobility. Eval Status: issued at [facility]  Current: Met, 3/7/2023     Pt will have painfree shoulder PROM to [facility] to aid in functional mechanics for ambulation/ADLs. Eval Status:   ROM:                            PROM              Shoulder Left Right   Flexion 57 WNL   ABD 65 WNL   ER 8  (Measured at 35 degrees abduction) WNL   IR Within available limits  (Measured at 35 degrees abduction) WNL   Current: In-progress, flexion 170 degrees, ER 60 degrees, 3/9/2023     Long Term Goals:  To be accomplished in 8 weeks:  Patient will improve FOTO score by 59 points (to 64) to improve functional tolerance for restoration of ADLs. Eval Status: FOTO 4  FOTO score = an established functional score where 100 = no disability  Current: In-progress, 54, 3/9/2023     Pt will have painfree left shoulder AROM to aid in functional mechanics for ambulation/ADLs. Eval Status:                                             AROM                            Shoulder Left Right   Flexion 58 WNL   ABD 52 WNL   ER NT due to pain WNL   IR Within available limits  (Measured at 35 degrees abduction) WNL    Current: In-progress, Left shoulder AROM 168 degrees flexion, 3/9/2023     Pt will have at least 4+/5 left shoulder strength to return to goals of returning to modified gym workouts. Eval Status: Strength NT due to post-op status  Current: In-progress, 3+/5, 3/9/2023     Patient will improve pain in left shoulder to 0-2/10 at worst to improve ADL and reaching tolerance and restore prior level of function. Eval Status: 3-4/10 at worst  Current:  In-progress,0-3/10, 3/15/2023          PLAN  Yes  Continue plan of care  []  Upgrade activities as tolerated  []  Discharge due to :  []  Other:    Ann Deng, PT    3/15/2023    11:11 AM    Future Appointments   Date Time Provider Izabella Rodriges   3/21/2023  4:00 PM Denis Siddiqi [facility]   3/30/2023  5:00 PM Tricia Check [facility]   4/6/2023  5:00 PM Tricia Check [facility]   4/12/2023  4:00 PM Tricia Check [facility]

## 2023-03-21 ENCOUNTER — HOSPITAL ENCOUNTER (OUTPATIENT)
Facility: HOSPITAL | Age: 48
Setting detail: RECURRING SERIES
Discharge: HOME | End: 2023-03-24
Payer: COMMERCIAL

## 2023-03-21 PROCEDURE — 97112 NEUROMUSCULAR REEDUCATION: CPT

## 2023-03-21 PROCEDURE — 97530 THERAPEUTIC ACTIVITIES: CPT

## 2023-03-21 PROCEDURE — 97110 THERAPEUTIC EXERCISES: CPT

## 2023-03-21 NOTE — PROGRESS NOTES
functional score where 100 = no disability  Current: In-progress, 54, 3/9/2023     Pt will have painfree left shoulder AROM to aid in functional mechanics for ambulation/ADLs. Eval Status:                                             AROM                            Shoulder Left Right   Flexion 58 WNL   ABD 52 WNL   ER NT due to pain WNL   IR Within available limits  (Measured at 35 degrees abduction) WNL    Current: In-progress, Left shoulder AROM 168 degrees flexion, 3/9/2023     Pt will have at least 4+/5 left shoulder strength to return to goals of returning to modified gym workouts. Eval Status: Strength NT due to post-op status  Current: In-progress, 4-/5, 3/21/2023     Patient will improve pain in left shoulder to 0-2/10 at worst to improve ADL and reaching tolerance and restore prior level of function. Eval Status: 3-4/10 at worst  Current:  In-progress,0-3/10, 3/15/2023     PLAN  Yes  Continue plan of care  []  Upgrade activities as tolerated  []  Discharge due to :  []  Other:    Aren Spears, PT    3/21/2023    3:45 PM    Future Appointments   Date Time Provider Izabella Rodriges   3/21/2023  4:00 PM Luis Miguel Espinoza [facility]   3/30/2023  5:00 PM [id number] Meghna Copeland [facility]   4/6/2023  5:00 PM [id number] Meghna Copeland [facility]   4/12/2023  4:00 PM [id number] Meghna Copeland [facility]

## 2023-03-30 ENCOUNTER — HOSPITAL ENCOUNTER (OUTPATIENT)
Facility: HOSPITAL | Age: 48
Setting detail: RECURRING SERIES
End: 2023-03-30
Payer: COMMERCIAL

## 2023-03-30 PROCEDURE — 97110 THERAPEUTIC EXERCISES: CPT

## 2023-03-30 PROCEDURE — 97530 THERAPEUTIC ACTIVITIES: CPT

## 2023-03-30 PROCEDURE — 97112 NEUROMUSCULAR REEDUCATION: CPT

## 2023-03-30 NOTE — PROGRESS NOTES
PHYSICAL / OCCUPATIONAL THERAPY - DAILY TREATMENT NOTE (updated )    Patient Name: Bebe Hackett    Date: 3/30/2023    : 1975  Insurance: Payor: Una Cruz / Plan: [employer]IM[employer] PPO / Product Type: *No Product type* /      Patient  verified Yes   Visit #   Current / Total 11 16   Time   In / Out 1700 1739   Pain   In / Out 0-1 0   Subjective Functional Status/Changes: Patient reports that he is compliant with HEP and has no increase in symptoms. Changes to:  Meds, Allergies, Med Hx, Sx Hx? If yes, update Summary List no       TREATMENT AREA =  Left shoulder pain [M25.512]  Superior glenoid labrum lesion of left shoulder, subsequent encounter [S46.189Y]    OBJECTIVE        Therapeutic Procedures: Tx Min Billable or 1:1 Min (if diff from Tx Min) Procedure, Rationale, Specifics   20  15188 Therapeutic Exercise (timed):  increase ROM, strength, coordination, balance, and proprioception to improve patient's ability to progress to PLOF and address remaining functional goals. (see flow sheet as applicable)     Details if applicable:       10  88725 Therapeutic Activity (timed):  use of dynamic activities replicating functional movements to increase ROM, strength, coordination, balance, and proprioception in order to improve patient's ability to progress to PLOF and address remaining functional goals. (see flow sheet as applicable)     Details if applicable:     9  84883 Neuromuscular Re-Education (timed):  improve balance, coordination, kinesthetic sense, posture, core stability and proprioception to improve patient's ability to develop conscious control of individual muscles and awareness of position of extremities in order to progress to PLOF and address remaining functional goals.  (see flow sheet as applicable)     Details if applicable:     44  [facility] Totals Reminder: bill using total billable min of TIMED therapeutic procedures (example: do not include dry needle or estim unattended, both untimed codes, in totals to left)  8-22 min = 1 unit; 23-37 min = 2 units; 38-52 min = 3 units; 53-67 min = 4 units; 68-82 min = 5 units   Total Total     [x]  Patient Education billed concurrently with other procedures   [x] Review HEP    [] Progressed/Changed HEP, detail:    [] Other detail:       Objective Information/Functional Measures/Assessment    Patient demonstrated improved activity tolerance. Advanced exercise by increasing resistance as patient is progressing in accordance with protocol. Will advance exercise next visit.     Patient will continue to benefit from skilled PT / OT services to modify and progress therapeutic interventions, analyze and address functional mobility deficits, analyze and address ROM deficits, analyze and address strength deficits, analyze and address soft tissue restrictions, analyze and cue for proper movement patterns, analyze and modify for postural abnormalities, analyze and address imbalance/dizziness, and instruct in home and community integration to address functional deficits and attain remaining goals.    Progress toward goals / Updated goals:  []  See Progress Note/Recertification    Short Term Goals: To be accomplished in 4 weeks:  Patient will be independent and compliant with HEP to progress toward goals and restore functional mobility.   Eval Status: issued at eval  Current: Met, 3/7/2023     Pt will have painfree shoulder PROM to WFL to aid in functional mechanics for ambulation/ADLs.  Eval Status:   ROM:                            PROM              Shoulder Left Right   Flexion 57 WNL   ABD 65 WNL   ER 8  (Measured at 35 degrees abduction) WNL   IR Within available limits  (Measured at 35 degrees abduction) WNL   Current: In-progress, flexion 170 degrees, ER 60 degrees, 3/9/2023     Long Term Goals: To be accomplished in 8 weeks:  Patient will improve FOTO score by 59 points (to 64) to improve functional tolerance for restoration of ADLs.  Eval Status: FOTO 4  FOTO score = an

## 2023-04-06 ENCOUNTER — APPOINTMENT (OUTPATIENT)
Facility: HOSPITAL | Age: 48
End: 2023-04-06
Payer: COMMERCIAL

## 2023-04-20 ENCOUNTER — HOSPITAL ENCOUNTER (OUTPATIENT)
Facility: HOSPITAL | Age: 48
Setting detail: RECURRING SERIES
Discharge: HOME | End: 2023-04-23
Payer: COMMERCIAL

## 2023-04-20 PROCEDURE — 97112 NEUROMUSCULAR REEDUCATION: CPT

## 2023-04-20 PROCEDURE — 97110 THERAPEUTIC EXERCISES: CPT

## 2023-04-20 PROCEDURE — 97530 THERAPEUTIC ACTIVITIES: CPT
